# Patient Record
Sex: MALE | Race: WHITE
[De-identification: names, ages, dates, MRNs, and addresses within clinical notes are randomized per-mention and may not be internally consistent; named-entity substitution may affect disease eponyms.]

---

## 2020-12-09 ENCOUNTER — HOSPITAL ENCOUNTER (OUTPATIENT)
Dept: HOSPITAL 47 - EC | Age: 70
Setting detail: OBSERVATION
LOS: 2 days | Discharge: HOME | End: 2020-12-11
Attending: INTERNAL MEDICINE | Admitting: INTERNAL MEDICINE
Payer: MEDICARE
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
## 2020-12-09 DIAGNOSIS — I11.9: ICD-10-CM

## 2020-12-09 DIAGNOSIS — S09.90XA: ICD-10-CM

## 2020-12-09 DIAGNOSIS — R55: ICD-10-CM

## 2020-12-09 DIAGNOSIS — Z23: ICD-10-CM

## 2020-12-09 DIAGNOSIS — E86.0: ICD-10-CM

## 2020-12-09 DIAGNOSIS — I48.0: Primary | ICD-10-CM

## 2020-12-09 DIAGNOSIS — E66.01: ICD-10-CM

## 2020-12-09 LAB
ALBUMIN SERPL-MCNC: 4.4 G/DL (ref 3.5–5)
ALP SERPL-CCNC: 148 U/L (ref 38–126)
ALT SERPL-CCNC: 30 U/L (ref 4–49)
ANION GAP SERPL CALC-SCNC: 12 MMOL/L
APTT BLD: 25.2 SEC (ref 22–30)
AST SERPL-CCNC: 33 U/L (ref 17–59)
BASOPHILS # BLD AUTO: 0.1 K/UL (ref 0–0.2)
BASOPHILS NFR BLD AUTO: 1 %
BUN SERPL-SCNC: 24 MG/DL (ref 9–20)
CALCIUM SPEC-MCNC: 9.4 MG/DL (ref 8.4–10.2)
CHLORIDE SERPL-SCNC: 102 MMOL/L (ref 98–107)
CO2 SERPL-SCNC: 21 MMOL/L (ref 22–30)
EOSINOPHIL # BLD AUTO: 0.1 K/UL (ref 0–0.7)
EOSINOPHIL NFR BLD AUTO: 1 %
ERYTHROCYTE [DISTWIDTH] IN BLOOD BY AUTOMATED COUNT: 5.12 M/UL (ref 4.3–5.9)
ERYTHROCYTE [DISTWIDTH] IN BLOOD: 13.3 % (ref 11.5–15.5)
GLUCOSE SERPL-MCNC: 125 MG/DL (ref 74–99)
HCT VFR BLD AUTO: 47 % (ref 39–53)
HGB BLD-MCNC: 16.4 GM/DL (ref 13–17.5)
INR PPP: 1 (ref ?–1.2)
LYMPHOCYTES # SPEC AUTO: 1.4 K/UL (ref 1–4.8)
LYMPHOCYTES NFR SPEC AUTO: 14 %
MAGNESIUM SPEC-SCNC: 2.1 MG/DL (ref 1.6–2.3)
MCH RBC QN AUTO: 32 PG (ref 25–35)
MCHC RBC AUTO-ENTMCNC: 34.8 G/DL (ref 31–37)
MCV RBC AUTO: 91.9 FL (ref 80–100)
MONOCYTES # BLD AUTO: 0.4 K/UL (ref 0–1)
MONOCYTES NFR BLD AUTO: 4 %
NEUTROPHILS # BLD AUTO: 7.9 K/UL (ref 1.3–7.7)
NEUTROPHILS NFR BLD AUTO: 79 %
PLATELET # BLD AUTO: 203 K/UL (ref 150–450)
POTASSIUM SERPL-SCNC: 4.4 MMOL/L (ref 3.5–5.1)
PROT SERPL-MCNC: 7.4 G/DL (ref 6.3–8.2)
PT BLD: 10.6 SEC (ref 9–12)
SODIUM SERPL-SCNC: 135 MMOL/L (ref 137–145)
WBC # BLD AUTO: 10 K/UL (ref 3.8–10.6)

## 2020-12-09 PROCEDURE — 80053 COMPREHEN METABOLIC PANEL: CPT

## 2020-12-09 PROCEDURE — 80048 BASIC METABOLIC PNL TOTAL CA: CPT

## 2020-12-09 PROCEDURE — 80061 LIPID PANEL: CPT

## 2020-12-09 PROCEDURE — 90471 IMMUNIZATION ADMIN: CPT

## 2020-12-09 PROCEDURE — 93005 ELECTROCARDIOGRAM TRACING: CPT

## 2020-12-09 PROCEDURE — 36415 COLL VENOUS BLD VENIPUNCTURE: CPT

## 2020-12-09 PROCEDURE — 72125 CT NECK SPINE W/O DYE: CPT

## 2020-12-09 PROCEDURE — 85730 THROMBOPLASTIN TIME PARTIAL: CPT

## 2020-12-09 PROCEDURE — 70486 CT MAXILLOFACIAL W/O DYE: CPT

## 2020-12-09 PROCEDURE — 85379 FIBRIN DEGRADATION QUANT: CPT

## 2020-12-09 PROCEDURE — 99285 EMERGENCY DEPT VISIT HI MDM: CPT

## 2020-12-09 PROCEDURE — 84443 ASSAY THYROID STIM HORMONE: CPT

## 2020-12-09 PROCEDURE — 90715 TDAP VACCINE 7 YRS/> IM: CPT

## 2020-12-09 PROCEDURE — 83735 ASSAY OF MAGNESIUM: CPT

## 2020-12-09 PROCEDURE — 85025 COMPLETE CBC W/AUTO DIFF WBC: CPT

## 2020-12-09 PROCEDURE — 12011 RPR F/E/E/N/L/M 2.5 CM/<: CPT

## 2020-12-09 PROCEDURE — 85610 PROTHROMBIN TIME: CPT

## 2020-12-09 PROCEDURE — 96360 HYDRATION IV INFUSION INIT: CPT

## 2020-12-09 PROCEDURE — 70450 CT HEAD/BRAIN W/O DYE: CPT

## 2020-12-09 PROCEDURE — 71275 CT ANGIOGRAPHY CHEST: CPT

## 2020-12-09 PROCEDURE — 97161 PT EVAL LOW COMPLEX 20 MIN: CPT

## 2020-12-09 PROCEDURE — 93306 TTE W/DOPPLER COMPLETE: CPT

## 2020-12-09 PROCEDURE — 96361 HYDRATE IV INFUSION ADD-ON: CPT

## 2020-12-09 PROCEDURE — 84484 ASSAY OF TROPONIN QUANT: CPT

## 2020-12-09 PROCEDURE — 97165 OT EVAL LOW COMPLEX 30 MIN: CPT

## 2020-12-09 RX ADMIN — CEFAZOLIN SCH MLS/HR: 330 INJECTION, POWDER, FOR SOLUTION INTRAMUSCULAR; INTRAVENOUS at 19:28

## 2020-12-09 RX ADMIN — APIXABAN SCH MG: 5 TABLET, FILM COATED ORAL at 21:16

## 2020-12-09 NOTE — CT
EXAMINATION TYPE: CT facial bones wo con

 

DATE OF EXAM: 12/9/2020

 

COMPARISON: None

 

HISTORY: fall, left side head contusion/lacerations

 

CT DLP: 1067 mGycm

 

CONTRAST: 0 mL of Isovue 300

 

The paranasal sinuses are examined in the axial plane at 2 mm thick sections.  Reconstructed images i
n the coronal plane were obtained.  

 

Soft tissue swelling is over the left cheek region. A small amount of subcutaneous air is present. Sm
all area of increased density can be related to small hemorrhage. Soft tissue injury extends from the
 upper level of the mandible to the zygomatic arch level and left periorbital region on this appears 
to be superficial. No radiopaque foreign bodies are evident

 

The maxillary sinuses are clear.  The ethmoid air cells are clear.  The sphenoid sinuses are clear.  
The frontal sinuses are clear.  

 

The septum is evaluated.  There is septal deviation to the left.

The ostiomeatal units are patent.

 

 

 

IMPRESSIONS:

1.  Superficial soft tissue swelling left cheek region

## 2020-12-09 NOTE — CT
EXAMINATION TYPE: CT brain syed wo con

 

DATE OF EXAM: 12/9/2020

 

COMPARISON: None

 

HISTORY: fall, left side head contusion/lacerations

 

CT DLP: 1626.3 mGycm, Automated exposure control for dose reduction was used.

 

CONTRAST: Patient injected with 0 mL of Isovue 300.

 

CT of the brain is performed utilizing 3 mm thick sections through the posterior fossa and 3 mm thick
 sections through the remaining calvarium.  

 

Study is performed within 24 hours of arrival to the hospital.

 

 No abnormal hyperdensity is present to suggest an acute intracranial hemorrhage.

No mass lesion is evident.

No acute infarcts are evident.  Periventricular white matter hypodensity is present, likely on the ba
sis of chronic white matter ischemic changes.

Ventricles and sulci are appropriate for the patient age.  

 

Paranasal sinuses and mastoid air cells within the field-of-view are clear. 

 

IMPRESSIONS:

1. Nonacute appearing Periventricular white matter ischemic type changes.

 

CT cervical spine.

 

COMPARISON: None

 

CT of the cervical spine is performed in the axial plane at 2 mm thick sections.  Reconstructed image
s in the coronal, and sagittal plane are reviewed on the computer. 

 

No acute fractures are evident.

Vertebral body alignment is normal.

There is loss of disc height especially noted C5-6 and C6-7. Uncovertebral joint hypertrophy and face
t hypertrophy is contributing to severe right foraminal stenosis at C3-4. Mild uncovertebral joint hy
pertrophy is present C4-5. Moderate for uncovertebral joint hypertrophy is present C5-6, greater on t
he left with moderate foraminal stenosis

Vertebral body heights are preserved.

Prevertebral space is normal 

 

IMPRESSIONS:

1. Degenerative disc changes.

2. Uncovertebral joint hypertrophy contributing to foraminal narrowing discussed above.

3. No acute osseous abnormality

## 2020-12-09 NOTE — ED
General Adult HPI





- General


Chief complaint: Fall


Stated complaint: fall/head injury


Time Seen by Provider: 12/09/20 14:57


Source: patient, family, EMS


Mode of arrival: EMS


Limitations: altered mental status





- History of Present Illness


Initial comments: 


Dictation was produced using dragon dictation software. please excuse any 

grammatical, word or spelling errors. 





This patient was cared for during a federal and state declared state of 

emergency secondary to Covid 19





Chief Complaint: 70-year-old male with alleged known medical history presents 

with fall





History of Present Illness: 70-year-old male, hours ago patient expressive fall.

 Fall was unwitnessed.  Wife is at bedside was able to help provide history of 

present illness.  Wife reports that she observe patient watch walk past her when

second later she were allowed thumb.  Patient was found unconscious by grandson.

 He was unconscious for couple minutes.  Patient then woke up and wife reports 

that patient was combative for several minutes.  EMS was called.  Patient denies

any pain at this time.  There was some blood from a bleeding laceration to the 

left periorbital area.  Patient does not recall how he fell or why he fell.  He 

has no complaints at this time.  Patient does not take any daily medications.








The ROS documented in this emergency department record has been reviewed and 

confirmed by me.  Those systems with pertinent positive or negative responses h

ave been documented in the HPI.  All other systems are other negative and/or 

noncontributory.








PHYSICAL EXAM:


General Impression: Alert and oriented x3, not in acute distress


HEENT: 270 laceration to the left superior maxilla periorbital space, extra-

ocular movements intact, pupils equal and reactive to light bilaterally, mucous 

membranes moist.


Cardiovascular: Heart regular rate and rhythm


Chest: Able to complete full sentences, no retractions, no tachypnea


Abdomen: abdomen soft, non-tender, non-distended, no organomegaly


Musculoskeletal: Pulses present and equal in all extremities, no peripheral 

edema


Motor:  no focal deficits noted


Neurological: CN II-XII grossly intact, no focal motor or sensory deficits noted


Skin: Intact with no visualized rashes, large callus to the plantar surface of 

the right foot


Psych: Normal affect and mood





ED course: 70-year-old male presents today after syncope and fall.  Vital signs 

upon arrival shows heart rate of 118, rest of vital signs within acceptable 

limits.  EKG shows no onset atrial fibrillation.


Computed tomography scan of the face and head and C-spine shows no acute 

processes.  Laboratory evaluation obtained.  CBC unremarkable.  Coag panel is 

negative.  Metabolic panel shows no significant abnormalities except for some 

mild dehydration with a BUN of 24 and creatinine 0.88.  Orthostatic vital signs 

are negative.  Patient tolerate oral.  He is ambulatory at baseline.  

Disposition options were discussed with patient and he is very motivated to go 

home.  Patient's mental status is unremarkable.  He is stable.  Lacerations were

repaired at bedside using Dermabond and Steri-Strips.  Patient is adamant about 

being discharged.  He was highly discouraged not to be discharge.  Case is 

discussed with Dr. Siddiqui who also agrees that if he wants to leave he 

should sign out AGAINST MEDICAL ADVICE.  Patient discussed his situation with 

his wife and family does agree to be admitted to the hospital for new onset A. 

fib.  Patient started on requested metoprolol.  Seems that his A. fib is 

episodic.  Cardiology will be consulted.





EKG interpretation: Ventricular rate 85, A. fib with RVR, QRS 90, . No RI

prolongation, no QTC prolongation, no ST or T-wave changes noted.  No onset A. 

fib











- Related Data


                                Home Medications











 Medication  Instructions  Recorded  Confirmed


 


Acetaminophen Tab [Tylenol Tab] 1,000 mg PO Q6HR PRN 12/09/20 12/09/20


 


Ibuprofen [Advil] 200 - 400 mg PO Q8HR PRN 12/09/20 12/09/20


 


Ibuprofen [Motrin Ib] 200 - 800 mg PO Q8H PRN 12/09/20 12/09/20


 


Naproxen Sodium [Aleve] 220 - 440 mg PO DAILY PRN 12/09/20 12/09/20











                                    Allergies











Allergy/AdvReac Type Severity Reaction Status Date / Time


 


loratadine [From Claritin-D] AdvReac  Unknown Verified 12/09/20 16:25


 


pseudoephedrine AdvReac  Unknown Verified 12/09/20 16:25





[From Claritin-D]     














Review of Systems


ROS Statement: 


Those systems with pertinent positive or pertinent negative responses have been 

documented in the HPI.





ROS Other: All systems not noted in ROS Statement are negative.





Past Medical History


Past Medical History: Hypertension, Osteoarthritis (OA)


History of Any Multi-Drug Resistant Organisms: None Reported


Past Surgical History: No Surgical Hx Reported


Past Psychological History: No Psychological Hx Reported


Smoking Status: Never smoker


Past Alcohol Use History: None Reported


Past Drug Use History: None Reported





General Exam


Limitations: altered mental status





Course


                                   Vital Signs











  12/09/20 12/09/20 12/09/20





  14:32 17:02 17:29


 


Temperature 98.4 F  


 


Pulse Rate 118 H 100 


 


Pulse Rate [   122 H





Sitting]   


 


Pulse Rate [   90





Standing]   


 


Pulse Rate [   121 H





Supine]   


 


Respiratory 22 18 





Rate   


 


Blood Pressure 141/94 140/90 


 


Blood Pressure   171/93





[Sitting]   


 


Blood Pressure   141/76





[Standing]   


 


Blood Pressure   148/81





[Supine]   


 


O2 Sat by Pulse 97 97 





Oximetry   














Procedures





- Laceration


  ** Laceration #1


Consent Obtained: verbal consent


Indication: laceration


Site: face


Size (cm): 2


Description: linear


Depth: simple, single layer


Pre-repair: irrigated extensively


Type of Sutures: other (dermabond, steri strip, mastisol)


Patient Tolerated Procedure: well





Medical Decision Making





- Lab Data


Result diagrams: 


                                 12/09/20 15:25





                                 12/09/20 15:25


                                   Lab Results











  12/09/20 12/09/20 12/09/20 Range/Units





  15:25 15:25 15:25 


 


WBC  10.0    (3.8-10.6)  k/uL


 


RBC  5.12    (4.30-5.90)  m/uL


 


Hgb  16.4    (13.0-17.5)  gm/dL


 


Hct  47.0    (39.0-53.0)  %


 


MCV  91.9    (80.0-100.0)  fL


 


MCH  32.0    (25.0-35.0)  pg


 


MCHC  34.8    (31.0-37.0)  g/dL


 


RDW  13.3    (11.5-15.5)  %


 


Plt Count  203    (150-450)  k/uL


 


MPV  8.8    


 


Neutrophils %  79    %


 


Lymphocytes %  14    %


 


Monocytes %  4    %


 


Eosinophils %  1    %


 


Basophils %  1    %


 


Neutrophils #  7.9 H    (1.3-7.7)  k/uL


 


Lymphocytes #  1.4    (1.0-4.8)  k/uL


 


Monocytes #  0.4    (0-1.0)  k/uL


 


Eosinophils #  0.1    (0-0.7)  k/uL


 


Basophils #  0.1    (0-0.2)  k/uL


 


PT   10.6   (9.0-12.0)  sec


 


INR   1.0   (<1.2)  


 


APTT   25.2   (22.0-30.0)  sec


 


Sodium    135 L  (137-145)  mmol/L


 


Potassium    4.4  (3.5-5.1)  mmol/L


 


Chloride    102  ()  mmol/L


 


Carbon Dioxide    21 L  (22-30)  mmol/L


 


Anion Gap    12  mmol/L


 


BUN    24 H  (9-20)  mg/dL


 


Creatinine    0.88  (0.66-1.25)  mg/dL


 


Est GFR (CKD-EPI)AfAm    >90  (>60 ml/min/1.73 sqM)  


 


Est GFR (CKD-EPI)NonAf    87  (>60 ml/min/1.73 sqM)  


 


Glucose    125 H  (74-99)  mg/dL


 


Calcium    9.4  (8.4-10.2)  mg/dL


 


Magnesium    2.1  (1.6-2.3)  mg/dL


 


Total Bilirubin    1.1  (0.2-1.3)  mg/dL


 


AST    33  (17-59)  U/L


 


ALT    30  (4-49)  U/L


 


Alkaline Phosphatase    148 H  ()  U/L


 


Troponin I     (0.000-0.034)  ng/mL


 


Total Protein    7.4  (6.3-8.2)  g/dL


 


Albumin    4.4  (3.5-5.0)  g/dL














  12/09/20 Range/Units





  15:25 


 


WBC   (3.8-10.6)  k/uL


 


RBC   (4.30-5.90)  m/uL


 


Hgb   (13.0-17.5)  gm/dL


 


Hct   (39.0-53.0)  %


 


MCV   (80.0-100.0)  fL


 


MCH   (25.0-35.0)  pg


 


MCHC   (31.0-37.0)  g/dL


 


RDW   (11.5-15.5)  %


 


Plt Count   (150-450)  k/uL


 


MPV   


 


Neutrophils %   %


 


Lymphocytes %   %


 


Monocytes %   %


 


Eosinophils %   %


 


Basophils %   %


 


Neutrophils #   (1.3-7.7)  k/uL


 


Lymphocytes #   (1.0-4.8)  k/uL


 


Monocytes #   (0-1.0)  k/uL


 


Eosinophils #   (0-0.7)  k/uL


 


Basophils #   (0-0.2)  k/uL


 


PT   (9.0-12.0)  sec


 


INR   (<1.2)  


 


APTT   (22.0-30.0)  sec


 


Sodium   (137-145)  mmol/L


 


Potassium   (3.5-5.1)  mmol/L


 


Chloride   ()  mmol/L


 


Carbon Dioxide   (22-30)  mmol/L


 


Anion Gap   mmol/L


 


BUN   (9-20)  mg/dL


 


Creatinine   (0.66-1.25)  mg/dL


 


Est GFR (CKD-EPI)AfAm   (>60 ml/min/1.73 sqM)  


 


Est GFR (CKD-EPI)NonAf   (>60 ml/min/1.73 sqM)  


 


Glucose   (74-99)  mg/dL


 


Calcium   (8.4-10.2)  mg/dL


 


Magnesium   (1.6-2.3)  mg/dL


 


Total Bilirubin   (0.2-1.3)  mg/dL


 


AST   (17-59)  U/L


 


ALT   (4-49)  U/L


 


Alkaline Phosphatase   ()  U/L


 


Troponin I  <0.012  (0.000-0.034)  ng/mL


 


Total Protein   (6.3-8.2)  g/dL


 


Albumin   (3.5-5.0)  g/dL














Disposition


Clinical Impression: 


 Fall, New onset a-fib





Disposition: ADMITTED AS IP TO THIS HOSP


Condition: Fair


Referrals: 


None,Stated [Primary Care Provider] - 1-2 days


Decision Time: 17:49

## 2020-12-10 LAB
ANION GAP SERPL CALC-SCNC: 8 MMOL/L
BUN SERPL-SCNC: 18 MG/DL (ref 9–20)
CALCIUM SPEC-MCNC: 9.1 MG/DL (ref 8.4–10.2)
CHLORIDE SERPL-SCNC: 104 MMOL/L (ref 98–107)
CHOLEST SERPL-MCNC: 177 MG/DL (ref ?–200)
CO2 SERPL-SCNC: 25 MMOL/L (ref 22–30)
GLUCOSE SERPL-MCNC: 106 MG/DL (ref 74–99)
HDLC SERPL-MCNC: 57 MG/DL (ref 40–60)
LDLC SERPL CALC-MCNC: 101 MG/DL (ref 0–99)
MAGNESIUM SPEC-SCNC: 2.1 MG/DL (ref 1.6–2.3)
POTASSIUM SERPL-SCNC: 4.5 MMOL/L (ref 3.5–5.1)
SODIUM SERPL-SCNC: 137 MMOL/L (ref 137–145)
TRIGL SERPL-MCNC: 96 MG/DL (ref ?–150)

## 2020-12-10 RX ADMIN — LOSARTAN POTASSIUM SCH MG: 25 TABLET, FILM COATED ORAL at 12:04

## 2020-12-10 RX ADMIN — CEFAZOLIN SCH: 330 INJECTION, POWDER, FOR SOLUTION INTRAMUSCULAR; INTRAVENOUS at 20:35

## 2020-12-10 RX ADMIN — PANTOPRAZOLE SODIUM SCH MG: 40 TABLET, DELAYED RELEASE ORAL at 06:46

## 2020-12-10 RX ADMIN — CEFAZOLIN SCH MLS/HR: 330 INJECTION, POWDER, FOR SOLUTION INTRAMUSCULAR; INTRAVENOUS at 06:46

## 2020-12-10 RX ADMIN — APIXABAN SCH MG: 5 TABLET, FILM COATED ORAL at 20:35

## 2020-12-10 RX ADMIN — APIXABAN SCH MG: 5 TABLET, FILM COATED ORAL at 08:28

## 2020-12-10 NOTE — P.CRDCN
History of Present Illness


History of present illness: 





HISTORY OF PRESENTING ILLNESS


This is a pleasant 70-year-old  male past medical history significant 

for hypertension and morbid obesity.  He denies prior history of coronary artery

disease and does not follow with a cardiologist for any reason.  He was 

diagnosed with hypertension some years ago however does not currently take any 

antihypertensives. We have been asked to see in consultation for atrial 

fibrillation.  The patient states he woke up yesterday feeling in his usual s

branch of health.  He doesn't recall the events surrounding his loss of 

consciousness.  According to ER documentation his wife found him unconscious and

unresponsive.  He was apparently unconscious for a couple of minutes and when he

woke up he was somewhat combative.  He did suffer a laceration to the left 

periorbital region.  He denies feeling any symptoms of chest pain, shortness of 

breath, dizziness, palpitations, nausea, vomiting or diaphoresis prior to 

passing out or thereafter.  EKG on arrival revealed atrial fibrillation with 

heart rates 135. He continues to be in afib with controlled rates. He was given 

a dose of lopressor and started on eliquis.  Laboratory data reviewed, WBC 10, 

hemoglobin 16.4, platelets 203, sodium 135, potassium 4.4, creatinine 0.88, 

magnesium 2.1, troponin negative 1.  He takes no daily cardiac medications.





REVIEW OF SYSTEMS


At the time of my exam:


CONSTITUTIONAL: Denies fever or chills.


CARDIOVASCULAR: Denies chest pain, shortness of breath, orthopnea, PND or 

palpitations.


RESPIRATORY: Denies cough. 


GASTROINTESTINAL: Denies abdominal pain, diarrhea, constipation, nausea or 

vomiting.


MUSCULOSKELETAL: Denies myalgias.


NEUROLOGIC: Denies numbness, tingling or weakness.


ENDOCRINE: Denies fatigue, weight change,  polydipsia or polyurina.


GENITOURINARY: Denies burning, hematuria or urgency with micturation.


HEMATOLOGIC: Denies history of anemia or bleeding. 





PHYSICAL EXAMINATION


Blood pressure 140/67 heart rate 105 afebrile and maintaining oxygen saturation 

on room air.


CONSTITUTIONAL: No apparent distress. 


HEENT: Head is normocephalic. Pupils are equal, round. Sclerae anicteric. Mucous

membranes of the mouth are moist.  No JVD. No carotid bruit.


CHEST EXAMINATION: Lungs are clear to auscultation. No chest wall tenderness is 

noted on palpation or with deep breathing. 


HEART EXAMINATION: Irregular rate and rhythm. S1, S2 heard. No murmurs, gallops 

or rub.


ABDOMEN: Soft, nontender. Positive bowel sounds.


EXTREMITIES: 2+ peripheral pulses, no lower extremity edema and no calf 

tenderness.


NEUROLOGIC EXAMINATION: Patient is awake, alert and oriented x3. 





ASSESSMENT


New onset paroxysmal atrial fibrillation with rapid ventricular rates


Syncopal episode


Hypertension





PLAN


Initiate lopressor 50 mg BID. 


Continue eliquis for thromboembolic protection. We will ask the  to 

check the monthly cost. 


Check d-dimer and second troponin.


Echocardiogram has been ordered and will be reviewed. 


Further recommendations to follow based on clinical course. 


Thank you kindly for this consultation.





Nurse Practitioner note has been reviewed, I agree with a documented findings 

and plan of care.  Patient was seen and examined.











Past Medical History


Past Medical History: Hypertension, Osteoarthritis (OA)


History of Any Multi-Drug Resistant Organisms: None Reported


Past Surgical History: No Surgical Hx Reported


Past Psychological History: No Psychological Hx Reported


Smoking Status: Never smoker


Past Alcohol Use History: None Reported


Past Drug Use History: None Reported





- Past Family History


  ** Mother


History Unknown: Yes





Medications and Allergies


                                Home Medications











 Medication  Instructions  Recorded  Confirmed  Type


 


Acetaminophen Tab [Tylenol Tab] 1,000 mg PO Q6HR PRN 12/09/20 12/09/20 History


 


Ibuprofen [Advil] 200 - 400 mg PO Q8HR PRN 12/09/20 12/09/20 History


 


Ibuprofen [Motrin Ib] 200 - 800 mg PO Q8H PRN 12/09/20 12/09/20 History


 


Naproxen Sodium [Aleve] 220 - 440 mg PO DAILY PRN 12/09/20 12/09/20 History








                                    Allergies











Allergy/AdvReac Type Severity Reaction Status Date / Time


 


loratadine [From Claritin-D] AdvReac  Unknown Verified 12/09/20 16:25


 


pseudoephedrine AdvReac  Unknown Verified 12/09/20 16:25





[From Claritin-D]     














Physical Exam


Vitals: 


                                   Vital Signs











  Temp Pulse Pulse Pulse Pulse Resp BP


 


 12/10/20 08:23  97.8 F     105 H  18 


 


 12/10/20 08:00     108 H   18 


 


 12/10/20 04:00  98.0 F   103 H  108 H  111 H  16 


 


 12/10/20 00:48     97   


 


 12/10/20 00:06  98.0 F   97    18 


 


 12/10/20 00:00  98.0 F      


 


 12/09/20 23:43  97.7 F  96     6 L  145/98


 


 12/09/20 23:00   90     16 


 


 12/09/20 22:54   89     16 


 


 12/09/20 20:31   114 H     18  144/84


 


 12/09/20 17:29    122 H  90  121 H  


 


 12/09/20 17:02   100     18  140/90


 


 12/09/20 14:32  98.4 F  118 H     22  141/94














  BP BP BP Pulse Ox


 


 12/10/20 08:23    140/67  96


 


 12/10/20 08:00    


 


 12/10/20 04:00  147/88  138/79  119/66  97


 


 12/10/20 00:48    


 


 12/10/20 00:06  169/97    96


 


 12/10/20 00:00    


 


 12/09/20 23:43     96


 


 12/09/20 23:00    


 


 12/09/20 22:54    


 


 12/09/20 20:31     96


 


 12/09/20 17:29  171/93  141/76  148/81 


 


 12/09/20 17:02     97


 


 12/09/20 14:32     97








                                Intake and Output











 12/09/20 12/10/20 12/10/20





 22:59 06:59 14:59


 


Intake Total  120 480


 


Output Total   300


 


Balance  120 180


 


Intake:   


 


  Intake, IV Titration  120 





  Amount   


 


    Sodium Chloride 0.9% 1,  120 





    000 ml @ 120 mls/hr IV .   





    Q8H20M [facility] Rx#:[id number]   


 


  Oral   480


 


Output:   


 


  Urine   300


 


Other:   


 


  Voiding Method  Toilet Toilet


 


  # Voids  1 


 


  Weight  167.5 kg 














Results





                                 12/09/20 15:25





                                 12/10/20 10:15


                                 Cardiac Enzymes











  12/09/20 12/09/20 Range/Units





  15:25 15:25 


 


AST  33   (17-59)  U/L


 


Troponin I   <0.012  (0.000-0.034)  ng/mL








                                   Coagulation











  12/09/20 Range/Units





  15:25 


 


PT  10.6  (9.0-12.0)  sec


 


APTT  25.2  (22.0-30.0)  sec








                                       CBC











  12/09/20 Range/Units





  15:25 


 


WBC  10.0  (3.8-10.6)  k/uL


 


RBC  5.12  (4.30-5.90)  m/uL


 


Hgb  16.4  (13.0-17.5)  gm/dL


 


Hct  47.0  (39.0-53.0)  %


 


Plt Count  203  (150-450)  k/uL








                          Comprehensive Metabolic Panel











  12/09/20 Range/Units





  15:25 


 


Sodium  135 L  (137-145)  mmol/L


 


Potassium  4.4  (3.5-5.1)  mmol/L


 


Chloride  102  ()  mmol/L


 


Carbon Dioxide  21 L  (22-30)  mmol/L


 


BUN  24 H  (9-20)  mg/dL


 


Creatinine  0.88  (0.66-1.25)  mg/dL


 


Glucose  125 H  (74-99)  mg/dL


 


Calcium  9.4  (8.4-10.2)  mg/dL


 


AST  33  (17-59)  U/L


 


ALT  30  (4-49)  U/L


 


Alkaline Phosphatase  148 H  ()  U/L


 


Total Protein  7.4  (6.3-8.2)  g/dL


 


Albumin  4.4  (3.5-5.0)  g/dL








                               Current Medications











Generic Name Dose Route Start Last Admin





  Trade Name Freq  PRN Reason Stop Dose Admin


 


Acetaminophen  325 mg  12/09/20 19:23 





  Acetaminophen Tab 325 Mg Tab  PO  





  Q6HR PRN  





  Pain  


 


Apixaban  5 mg  12/09/20 21:00  12/10/20 08:28





  Apixaban 5 Mg Tab  PO   5 mg





  BID EDMAR   Administration


 


Sodium Chloride  1,000 mls @ 120 mls/hr  12/09/20 17:45  12/10/20 06:46





  Saline 0.9%  IV   120 mls/hr





  .Q8H20M EDMAR   Administration


 


Metoprolol Tartrate  50 mg  12/10/20 09:00  12/10/20 09:15





  Metoprolol Tartrate 50 Mg Tab  PO   50 mg





  BID EDMAR   Administration


 


Naloxone HCl  0.2 mg  12/09/20 17:44 





  Naloxone 0.4 Mg/Ml 1 Ml Vial  IV  





  Q2M PRN  





  Opioid Reversal  


 


Pantoprazole Sodium  40 mg  12/10/20 07:30  12/10/20 06:46





  Pantoprazole 40 Mg Tablet  PO   40 mg





  [id number] EDMAR   Administration








                                Intake and Output











 12/09/20 12/10/20 12/10/20





 22:59 06:59 14:59


 


Intake Total  120 480


 


Output Total   300


 


Balance  120 180


 


Intake:   


 


  Intake, IV Titration  120 





  Amount   


 


    Sodium Chloride 0.9% 1,  120 





    000 ml @ 120 mls/hr IV .   





    Q8H20M [facility] Rx#:[id number]   


 


  Oral   480


 


Output:   


 


  Urine   300


 


Other:   


 


  Voiding Method  Toilet Toilet


 


  # Voids  1 


 


  Weight  167.5 kg 








                                        





                                 12/09/20 15:25 





                                 12/09/20 15:25

## 2020-12-10 NOTE — P.HPIM
History of Present Illness


Patient is 70-year-old male came in after dizziness and fall.  Patient has some 

facial trauma which was addressed in ER.  Patient is found to be in atrial 

fibrillation with rapid and regular rate.  Patient is presently on Cardizem drip

and patient was started on metoprolol.  Patient denied any shortness of breath 

orthopnea paroxysmal nocturnal dyspnea denied any significant cardiac history.  

Patient denied it doesn't have any signs or symptoms of sepsis at this time 

patient is not dehydrated denied any diarrhea.  Patient is an obese denied any 

history of sleep apnea.








Review of Systems








REVIEW OF SYSTEMS: 


CONSTITUTIONAL: No fever, no malaise, no fatigue. 


HEENT: No recent visual problems or hearing problems. Denied any sore throat. 


CARDIOVASCULAR: No chest pain, orthopnea, PND.


PULMONARY: No shortness of breath, no cough, no hemoptysis. 


GASTROINTESTINAL: No diarrhea, no nausea, no vomiting, no abdominal pain. 


NEUROLOGICAL: No headaches, no weakness, no numbness. 


HEMATOLOGICAL: Denies any bleeding or petechiae. 


GENITOURINARY: Denies any burning micturition, frequency, or urgency. 


MUSCULOSKELETAL/RHEUMATOLOGICAL: Denies any joint pain, swelling, or any muscle 

pain. 


ENDOCRINE: Denies any polyuria or polydipsia. 





The rest of the 14-point review of systems is negative.











Past Medical History


Past Medical History: Hypertension, Osteoarthritis (OA)


History of Any Multi-Drug Resistant Organisms: None Reported


Past Surgical History: No Surgical Hx Reported


Past Psychological History: No Psychological Hx Reported


Smoking Status: Never smoker


Past Alcohol Use History: None Reported


Past Drug Use History: None Reported





- Past Family History


  ** Mother


History Unknown: Yes





Medications and Allergies


                                Home Medications











 Medication  Instructions  Recorded  Confirmed  Type


 


Acetaminophen Tab [Tylenol Tab] 1,000 mg PO Q6HR PRN 12/09/20 12/09/20 History


 


Ibuprofen [Advil] 200 - 400 mg PO Q8HR PRN 12/09/20 12/09/20 History


 


Ibuprofen [Motrin Ib] 200 - 800 mg PO Q8H PRN 12/09/20 12/09/20 History


 


Naproxen Sodium [Aleve] 220 - 440 mg PO DAILY PRN 12/09/20 12/09/20 History








                                    Allergies











Allergy/AdvReac Type Severity Reaction Status Date / Time


 


loratadine [From Claritin-D] AdvReac  Unknown Verified 12/09/20 16:25


 


pseudoephedrine AdvReac  Unknown Verified 12/09/20 16:25





[From Claritin-D]     














Physical Exam


Vitals: 


                                   Vital Signs











  Temp Pulse Pulse Pulse Pulse Resp BP


 


 12/10/20 13:08     108 H   20 


 


 12/10/20 11:30  98.0 F   90    20 


 


 12/10/20 08:23  97.8 F     105 H  18 


 


 12/10/20 08:00     108 H   18 


 


 12/10/20 04:00  98.0 F   103 H  108 H  111 H  16 


 


 12/10/20 00:48     97   


 


 12/10/20 00:06  98.0 F   97    18 


 


 12/10/20 00:00  98.0 F      


 


 12/09/20 23:43  97.7 F  96     6 L  145/98


 


 12/09/20 23:00   90     16 


 


 12/09/20 22:54   89     16 


 


 12/09/20 20:31   114 H     18  144/84


 


 12/09/20 17:29    122 H  90  121 H  


 


 12/09/20 17:02   100     18  140/90


 


 12/09/20 14:32  98.4 F  118 H     22  141/94














  BP BP BP Pulse Ox


 


 12/10/20 13:08    


 


 12/10/20 11:30  131/83    96


 


 12/10/20 08:23    140/67  96


 


 12/10/20 08:00    


 


 12/10/20 04:00  147/88  138/79  119/66  97


 


 12/10/20 00:48    


 


 12/10/20 00:06  169/97    96


 


 12/10/20 00:00    


 


 12/09/20 23:43     96


 


 12/09/20 23:00    


 


 12/09/20 22:54    


 


 12/09/20 20:31     96


 


 12/09/20 17:29  171/93  141/76  148/81 


 


 12/09/20 17:02     97


 


 12/09/20 14:32     97








                                Intake and Output











 12/09/20 12/10/20 12/10/20





 22:59 06:59 14:59


 


Intake Total  120 480


 


Output Total   300


 


Balance  120 180


 


Intake:   


 


  Intake, IV Titration  120 





  Amount   


 


    Sodium Chloride 0.9% 1,  120 





    000 ml @ 120 mls/hr IV .   





    Q8H20M [facility] Rx#:[id number]   


 


  Oral   480


 


Output:   


 


  Urine   300


 


Other:   


 


  Voiding Method  Toilet Toilet


 


  # Voids  1 


 


  Weight  167.5 kg 

















PHYSICAL EXAMINATION: 





GENERAL: The patient is alert and oriented x3, not in any acute distress.  Obese


HEENT: Pupils are round and equally reacting to light. EOMI. No scleral icterus.

No conjunctival pallor.  Patient has a laceration and swelling in the left cheek

area secondary to fall. No pharyngeal erythema. No thyromegaly. 


CARDIOVASCULAR: S1 and S2 present. No murmurs, rubs, or gallops.  Tachycardic 

irregularly irregular rhythm


PULMONARY: Chest is clear to auscultation, no wheezing or crackles. 


ABDOMEN: Soft, nontender, nondistended, normoactive bowel sounds. No palpable 

organomegaly. 


MUSCULOSKELETAL: No joint swelling or deformity.


EXTREMITIES: No cyanosis, clubbing, or pedal edema. 


NEUROLOGICAL: Gross neurological examination did not reveal any focal deficits. 


SKIN: No rashes. 

















Results


CBC & Chem 7: 


                                 12/09/20 15:25





                                 12/10/20 10:15


Labs: 


                  Abnormal Lab Results - Last 24 Hours (Table)











  12/09/20 12/09/20 12/10/20 Range/Units





  15:25 15:25 10:07 


 


Neutrophils #  7.9 H    (1.3-7.7)  k/uL


 


D-Dimer    1.02 H  (<0.60)  mg/L FEU


 


Sodium   135 L   (137-145)  mmol/L


 


Carbon Dioxide   21 L   (22-30)  mmol/L


 


BUN   24 H   (9-20)  mg/dL


 


Glucose   125 H   (74-99)  mg/dL


 


Alkaline Phosphatase   148 H   ()  U/L


 


LDL Cholesterol, Calc     (0-99)  mg/dL














  12/10/20 12/10/20 Range/Units





  10:15 10:15 


 


Neutrophils #    (1.3-7.7)  k/uL


 


D-Dimer    (<0.60)  mg/L FEU


 


Sodium    (137-145)  mmol/L


 


Carbon Dioxide    (22-30)  mmol/L


 


BUN    (9-20)  mg/dL


 


Glucose  106 H   (74-99)  mg/dL


 


Alkaline Phosphatase    ()  U/L


 


LDL Cholesterol, Calc   101 H  (0-99)  mg/dL














Thrombosis Risk Factor Assmnt





- Choose All That Apply


Each Factor Represents 1 point: Obesity (BMI >25)


Each Risk Factor Represents 2 Points: Age 61-74 years


Thrombosis Risk Factor Assessment Total Risk Factor Score: 3


Thrombosis Risk Factor Assessment Level: Moderate Risk





Assessment and Plan


Plan: 


-Syncope: Secondary to new onset atrial fibrillation.  Patient was already 

started on anticoagulation which is appropriate which will continued patient was

started on metoprolol will try to wean off Cardizem.  Echocardiac exam showed 

decreased ejection fraction of 40-45%


-Possible acute systolic dysfunction without any pulmonary edema or acute 

exacerbation probably secondary to atrial fibrillation.


-Morbid obesity although patient has RVSP of only 33 but did have severely 

enlarged right ventricle.


-Hypertension

## 2020-12-10 NOTE — CT
EXAMINATION TYPE: CT angio chest

 

DATE OF EXAM: 12/10/2020 4:16 PM

 

COMPARISON: None.

 

HISTORY: Elevated d-dimer. Shortness of breath.

 

CT DLP: 880.5 mGycm

Automated exposure control for dose reduction was used.

 

CONTRAST: 

CTA scan of the thorax is performed with IV Contrast, patient injected with 100 mL of Isovue 370, pul
monary embolism protocol. .  

 

FINDINGS:

 

LUNGS: Exam is suboptimal as patient unable to hold breath. This limits evaluation for subcentimeter 
nodules and small groundglass opacities. Mild to moderate linear scarring and/or atelectasis in both 
bases. No suspicious focal consolidation. No pleural effusion or pneumothorax noted bilaterally. No c
oncerning masses.

 

MEDIASTINUM: There is suboptimal study with medical contrast in right and left heart systems, heterog
eneity in the periphery of present. No central pulmonary embolism is seen. Smaller segmental and subs
egmental pulmonary embolism is not entirely excluded on this exam . There are no greater than 1 cm hi
lar or mediastinal lymph nodes.   No pericardial effusion is seen. There is cardiomegaly with moderat
e to severe right greater than left biatrial dilatation. Mild coronary artery calcification.

 

OTHER:  Visualized liver is low dense with slightly lobulated peripheral contour, underlying cirrhosi
s cannot be excluded. Correlate clinically. No surrounding ascites noted. Fairly severe multilevel sp
urring in the spine.

 

 

 

IMPRESSION: Suboptimal study without central pulmonary embolism. Smaller segmental and subsegmental P
E is not entirely excluded. Cardiomegaly without suspicious acute pulmonary process.

## 2020-12-10 NOTE — ECHOF
Referral Reason:Rule out heart disease



MEASUREMENTS

--------

HEIGHT: 193.0 cm

WEIGHT: 167.4 kg

BP: 119/66

RVIDd:   4.1 cm     (< 3.3)

IVSd:   1.1 cm     (0.6 - 1.1)

LVIDd:   4.9 cm     (3.9 - 5.3)

LVPWd:   1.2 cm     (0.6 - 1.1)

IVSs:   1.5 cm

LVIDs:   4.1 cm

LVPWs:   1.6 cm

LAESV Index (A-L):   42.30 ml/m

Ao Diam:   3.6 cm     (2.0 - 3.7)

AV Cusp:   2.2 cm     (1.5 - 2.6)

MV EXCURSION:   14.924 mm     (> 18.000)

MV EF SLOPE:   75 mm/s     (70 - 150)

EPSS:   0.7 cm

RAP:   5.00 mmHg

RVSP:   33.46 mmHg







FINDINGS

--------

Atrial fibrillation.

This was a technically difficult study with suboptimal views.

The left ventricular size is normal.   There is mild concentric left ventricular hypertrophy.   Overa
ll left ventricular systolic function is mild-moderately impaired with, an EF between 40 - 45 %.

The right ventricle is moderate to  severely enlarged.

LA is moderately dilated 34-39 ml/m2

The right atrium is mildly enlarged.

xx ml of Lumason was utilized for enhancement of images.

Interatrial and interventricular septum intact.

There is mild aortic valve sclerosis.   There is no evidence of aortic regurgitation.   There is no e
vidence of aortic stenosis.

Mild mitral regurgitation is present.

Mild tricuspid regurgitation present.   There is borderline pulmonary hypertension.   The right ventr
icular systolic pressure, as measured by Doppler, is 33.46mmHg.

There is no pulmonic regurgitation present.

The aortic root size is normal.

IVC Not well visulized.

There is no pericardial effusion.



CONCLUSIONS

--------

1. The left ventricular size is normal.

2. There is mild concentric left ventricular hypertrophy.

3. Overall left ventricular systolic function is mild-moderately impaired with, an EF between 40 - 45
 %.

4. The right ventricle is moderate to  severely enlarged.

5. LA is moderately dilated 34-39 ml/m2

6. The right atrium is mildly enlarged.

7. There is mild aortic valve sclerosis.

8. Mild mitral regurgitation is present.

9. Mild tricuspid regurgitation present.

10. There is borderline pulmonary hypertension.

11. The right ventricular systolic pressure, as measured by Doppler, is 33.46mmHg.





SONOGRAPHER: Maddie White RDCS

## 2020-12-11 VITALS — DIASTOLIC BLOOD PRESSURE: 77 MMHG | TEMPERATURE: 97.6 F | HEART RATE: 81 BPM | SYSTOLIC BLOOD PRESSURE: 126 MMHG

## 2020-12-11 VITALS — RESPIRATION RATE: 18 BRPM

## 2020-12-11 RX ADMIN — PANTOPRAZOLE SODIUM SCH MG: 40 TABLET, DELAYED RELEASE ORAL at 06:20

## 2020-12-11 RX ADMIN — CEFAZOLIN SCH: 330 INJECTION, POWDER, FOR SOLUTION INTRAMUSCULAR; INTRAVENOUS at 12:47

## 2020-12-11 RX ADMIN — APIXABAN SCH MG: 5 TABLET, FILM COATED ORAL at 08:08

## 2020-12-11 RX ADMIN — LOSARTAN POTASSIUM SCH MG: 25 TABLET, FILM COATED ORAL at 08:08

## 2020-12-11 RX ADMIN — CEFAZOLIN SCH: 330 INJECTION, POWDER, FOR SOLUTION INTRAMUSCULAR; INTRAVENOUS at 06:20

## 2020-12-11 NOTE — P.PN
Subjective





HISTORY OF PRESENTING ILLNESS


This is a pleasant 70-year-old  male past medical history significant 

for hypertension and morbid obesity.  He denies prior history of coronary artery

disease and does not follow with a cardiologist for any reason. He continues to 

be in atrial fibrillation with controlled rates. Blood pressure 126/77 heart r

ate 81 afebrile and maintaining oxygen saturation on room air. Laboratory 

reviewed, d-dimer 1.02, sodium 137, potassium 4.5, creatinine 0.85, cardiac 

enzymes negative 2, TSH 3.04 and . CTA negative for large central PE. He

denies chest pain, shortness of breath, dizziness or palpitations. 





PHYSICAL EXAMINATION


CONSTITUTIONAL: No apparent distress. 


HEENT: Head is normocephalic. Pupils are equal, round. Sclerae anicteric. Mucous

membranes of the mouth are moist.  No JVD. No carotid bruit.


CHEST EXAMINATION: Lungs are clear to auscultation. No chest wall tenderness is 

noted on palpation or with deep breathing. 


HEART EXAMINATION: Irregular rate and rhythm. S1, S2 heard. No murmurs, gallops 

or rub.


EXTREMITIES: 2+ peripheral pulses, no lower extremity edema and no calf 

tenderness.





ASSESSMENT


New onset paroxysmal atrial fibrillation with rapid ventricular rates


Syncopal episode


Hypertension





PLAN


Stable for discharge from a cardiac perspective. 


We will give him a free 30-day supply coupon for eliquis and discuss further 

options in the office. 


Follow up with Dr. Shaw in 1-2 weeks.





Nurse Practitioner note has been reviewed, I agree with a documented findings 

and plan of care.  Patient was seen and examined.





Objective





- Vital Signs


Vital signs: 


                                   Vital Signs











Temp  97.6 F   12/11/20 12:00


 


Pulse  81   12/11/20 12:00


 


Resp  18   12/11/20 12:00


 


BP  126/77   12/11/20 12:00


 


Pulse Ox  94 L  12/11/20 12:00








                                 Intake & Output











 12/10/20 12/11/20 12/11/20





 18:59 06:59 18:59


 


Intake Total 600  214


 


Output Total 300  


 


Balance 300  214


 


Weight  167.4 kg 


 


Intake:   


 


  Oral 600  214


 


Output:   


 


  Urine 300  


 


Other:   


 


  Voiding Method Toilet Toilet 


 


  # Voids 2 1 1














- Labs


CBC & Chem 7: 


                                 12/09/20 15:25





                                 12/10/20 10:15


Labs: 


                  Abnormal Lab Results - Last 24 Hours (Table)











  12/10/20 Range/Units





  10:15 


 


Glucose  106 H  (74-99)  mg/dL

## 2025-03-05 ENCOUNTER — HOSPITAL ENCOUNTER (OUTPATIENT)
Dept: HOSPITAL 47 - LABWHC1 | Age: 75
Discharge: HOME | End: 2025-03-05
Attending: INTERNAL MEDICINE
Payer: MEDICARE

## 2025-03-05 DIAGNOSIS — I10: Primary | ICD-10-CM

## 2025-03-05 DIAGNOSIS — E78.5: ICD-10-CM

## 2025-03-05 LAB
ANION GAP SERPL CALC-SCNC: 13 MMOL/L (ref 4–12)
BASOPHILS # BLD AUTO: 0.08 X 10*3/UL (ref 0–0.1)
BASOPHILS NFR BLD AUTO: 1.1 %
BUN SERPL-SCNC: 16 MG/DL (ref 9–27)
BUN/CREAT SERPL: 14.55 RATIO (ref 12–20)
CALCIUM SPEC-MCNC: 9.2 MG/DL (ref 8.7–10.3)
CHLORIDE SERPL-SCNC: 105 MMOL/L (ref 96–109)
CHOLEST SERPL-MCNC: 156 MG/DL (ref 0–200)
CO2 SERPL-SCNC: 24 MMOL/L (ref 21.6–31.8)
EOSINOPHIL # BLD AUTO: 0.2 X 10*3/UL (ref 0.04–0.35)
EOSINOPHIL NFR BLD AUTO: 2.9 %
ERYTHROCYTE [DISTWIDTH] IN BLOOD BY AUTOMATED COUNT: 4.63 X 10*6/UL (ref 4.4–5.6)
ERYTHROCYTE [DISTWIDTH] IN BLOOD: 13.3 % (ref 11.5–14.5)
GLUCOSE SERPL-MCNC: 108 MG/DL (ref 70–110)
HCT VFR BLD AUTO: 42.7 % (ref 39.6–50)
HDLC SERPL-MCNC: 62.5 MG/DL (ref 40–60)
HGB BLD-MCNC: 14.5 G/DL (ref 13–17)
IMM GRANULOCYTES BLD QL AUTO: 0.6 %
LDLC SERPL CALC-MCNC: 79 MG/DL (ref 0–131)
LYMPHOCYTES # SPEC AUTO: 2.13 X 10*3/UL (ref 0.9–5)
LYMPHOCYTES NFR SPEC AUTO: 30.6 %
MCH RBC QN AUTO: 31.3 PG (ref 27–32)
MCHC RBC AUTO-ENTMCNC: 34 G/DL (ref 32–37)
MCV RBC AUTO: 92.2 FL (ref 80–97)
MONOCYTES # BLD AUTO: 0.47 X 10*3/UL (ref 0.2–1)
MONOCYTES NFR BLD AUTO: 6.7 %
NEUTROPHILS # BLD AUTO: 4.05 X 10*3/UL (ref 1.8–7.7)
NEUTROPHILS NFR BLD AUTO: 58.1 %
NRBC BLD AUTO-RTO: 0 X 10*3/UL (ref 0–0.01)
PLATELET # BLD AUTO: 181 X 10*3/UL (ref 140–440)
POTASSIUM SERPL-SCNC: 4 MMOL/L (ref 3.5–5.5)
SODIUM SERPL-SCNC: 142 MMOL/L (ref 135–145)
TRIGL SERPL-MCNC: 72.7 MG/DL (ref 0–149)
VLDLC SERPL CALC-MCNC: 14.54 MG/DL (ref 5–40)
WBC # BLD AUTO: 6.97 X 10*3/UL (ref 4.5–10)

## 2025-03-05 PROCEDURE — 80061 LIPID PANEL: CPT

## 2025-03-05 PROCEDURE — 85025 COMPLETE CBC W/AUTO DIFF WBC: CPT

## 2025-03-05 PROCEDURE — 80048 BASIC METABOLIC PNL TOTAL CA: CPT

## 2025-03-05 PROCEDURE — 36415 COLL VENOUS BLD VENIPUNCTURE: CPT
